# Patient Record
Sex: FEMALE | HISPANIC OR LATINO | Employment: PART TIME | URBAN - METROPOLITAN AREA
[De-identification: names, ages, dates, MRNs, and addresses within clinical notes are randomized per-mention and may not be internally consistent; named-entity substitution may affect disease eponyms.]

---

## 2022-03-18 PROCEDURE — 96374 THER/PROPH/DIAG INJ IV PUSH: CPT

## 2022-03-18 PROCEDURE — 96376 TX/PRO/DX INJ SAME DRUG ADON: CPT

## 2022-03-18 PROCEDURE — 96375 TX/PRO/DX INJ NEW DRUG ADDON: CPT

## 2022-03-18 PROCEDURE — 99284 EMERGENCY DEPT VISIT MOD MDM: CPT

## 2022-03-18 RX ORDER — SODIUM CHLORIDE 0.9 % (FLUSH) 0.9 %
10 SYRINGE (ML) INJECTION AS NEEDED
Status: DISCONTINUED | OUTPATIENT
Start: 2022-03-18 | End: 2022-03-19 | Stop reason: HOSPADM

## 2022-03-18 RX ORDER — ONDANSETRON 2 MG/ML
4 INJECTION INTRAMUSCULAR; INTRAVENOUS ONCE
Status: COMPLETED | OUTPATIENT
Start: 2022-03-18 | End: 2022-03-19

## 2022-03-19 ENCOUNTER — HOSPITAL ENCOUNTER (EMERGENCY)
Facility: HOSPITAL | Age: 47
Discharge: HOME | End: 2022-03-19
Attending: EMERGENCY MEDICINE | Admitting: EMERGENCY MEDICINE

## 2022-03-19 ENCOUNTER — APPOINTMENT (OUTPATIENT)
Dept: GENERAL RADIOLOGY | Facility: HOSPITAL | Age: 47
End: 2022-03-19

## 2022-03-19 VITALS
SYSTOLIC BLOOD PRESSURE: 98 MMHG | HEIGHT: 67 IN | BODY MASS INDEX: 32.18 KG/M2 | OXYGEN SATURATION: 94 % | TEMPERATURE: 97.3 F | HEART RATE: 68 BPM | WEIGHT: 205 LBS | RESPIRATION RATE: 14 BRPM | DIASTOLIC BLOOD PRESSURE: 57 MMHG

## 2022-03-19 DIAGNOSIS — T40.601A OPIATE OVERDOSE, ACCIDENTAL OR UNINTENTIONAL, INITIAL ENCOUNTER: Primary | ICD-10-CM

## 2022-03-19 LAB
AMPHET+METHAMPHET UR QL: NEGATIVE
BARBITURATES UR QL SCN: NEGATIVE
BENZODIAZ UR QL SCN: NEGATIVE
CANNABINOIDS SERPL QL: POSITIVE
COCAINE UR QL: POSITIVE
METHADONE UR QL SCN: NEGATIVE
OPIATES UR QL: NEGATIVE
OXYCODONE UR QL SCN: NEGATIVE

## 2022-03-19 PROCEDURE — 80307 DRUG TEST PRSMV CHEM ANLYZR: CPT | Performed by: EMERGENCY MEDICINE

## 2022-03-19 PROCEDURE — 71045 X-RAY EXAM CHEST 1 VIEW: CPT

## 2022-03-19 PROCEDURE — 25010000002 ONDANSETRON PER 1 MG: Performed by: EMERGENCY MEDICINE

## 2022-03-19 RX ORDER — NALOXONE HYDROCHLORIDE 4 MG/.1ML
1 SPRAY NASAL AS NEEDED
Qty: 1 EACH | Refills: 0 | Status: SHIPPED | OUTPATIENT
Start: 2022-03-19

## 2022-03-19 RX ORDER — NALOXONE HYDROCHLORIDE 1 MG/ML
2 INJECTION INTRAMUSCULAR; INTRAVENOUS; SUBCUTANEOUS ONCE
Status: COMPLETED | OUTPATIENT
Start: 2022-03-19 | End: 2022-03-19

## 2022-03-19 RX ORDER — NALOXONE HYDROCHLORIDE 1 MG/ML
INJECTION INTRAMUSCULAR; INTRAVENOUS; SUBCUTANEOUS
Status: COMPLETED
Start: 2022-03-19 | End: 2022-03-19

## 2022-03-19 RX ADMIN — NALOXONE HYDROCHLORIDE 2 MG: 1 INJECTION INTRAMUSCULAR; INTRAVENOUS; SUBCUTANEOUS at 10:37

## 2022-03-19 RX ADMIN — NALOXONE HYDROCHLORIDE 2 MG: 1 INJECTION PARENTERAL at 10:33

## 2022-03-19 RX ADMIN — SODIUM CHLORIDE 1000 ML: 9 INJECTION, SOLUTION INTRAVENOUS at 00:15

## 2022-03-19 RX ADMIN — ONDANSETRON 4 MG: 2 INJECTION INTRAMUSCULAR; INTRAVENOUS at 00:16

## 2022-03-19 RX ADMIN — NALOXONE HYDROCHLORIDE 2 MG: 1 INJECTION PARENTERAL at 10:37

## 2022-03-19 NOTE — ED NOTES
Pt reassessed with  states she does not have a phone and does not know daughters number to call her son vinita however vinita is not answering and voicemail is full. Pt does not seem to understand that son Tacho was admitted for overdose and is unable to help her get home at this time. Pt states she will take a cab home however does not know the address. Called [address] to speak with nurse taking care of Tacho her son states he also does not know address and to keep calling vinita. ER Charge nurse Andrea notified.

## 2022-03-19 NOTE — ED PROVIDER NOTES
EMERGENCY DEPARTMENT ENCOUNTER  I wore full protective equipment throughout this patient encounter including a N95 mask, eye shield, gown and gloves. Hand hygiene was performed before donning protective equipment and after removal when leaving the room.    Room Number:  36/36  Date of encounter:  3/19/2022  PCP: Provider, No Known   History is obtained through a .    HPI:  Context: Marixa Eugene is a 47 y.o. female who presents to the ED c/o chief complaint of decreased level of consciousness.  Patient was waiting in the waiting room for a ride home when she was found with decreased level consciousness and barely breathing.  She was seen overnight for a accidental drug overdose on heroin.  Patient does not recall taking anything in the waiting room.  She states she last used heroin last night.  She was brought in to our emergency room again with decreased level consciousness and impending respiratory arrest.  She was given a total of 4 mg of Narcan which increased her level consciousness.  Patient now complains of being cold.  She denies nausea, vomiting or belly pain.  She does admit to having gooseflesh at this time.  She states she smokes cigarettes but does not drink alcohol.    MEDICAL HISTORY REVIEW  Reviewed in EPIC    PAST MEDICAL HISTORY  Active Ambulatory Problems     Diagnosis Date Noted   • No Active Ambulatory Problems     Resolved Ambulatory Problems     Diagnosis Date Noted   • No Resolved Ambulatory Problems     No Additional Past Medical History       PAST SURGICAL HISTORY  No past surgical history on file.    FAMILY HISTORY  No family history on file.    SOCIAL HISTORY  Social History     Socioeconomic History   • Marital status:        ALLERGIES  Patient has no allergy information on record.    The patient's allergies have been reviewed    REVIEW OF SYSTEMS  All systems reviewed and negative except for those discussed in HPI.     PHYSICAL EXAM  I have reviewed the triage  vital signs and nursing notes.  ED Triage Vitals   Temp Heart Rate Resp BP SpO2   03/18/22 2352 03/18/22 2347 03/18/22 2347 03/18/22 2347 03/18/22 2347   97.3 °F (36.3 °C) 73 14 152/98 98 %      Temp src Heart Rate Source Patient Position BP Location FiO2 (%)   -- -- -- -- --              General: Mild distress.  HENT: NCAT, PERRL, Nares patent.  Eyes: no scleral icterus.  Pupils are 2 mm and reactive to light.  Neck: trachea midline, no ROM limitations.  CV: regular rhythm, regular rate.  Respiratory: normal effort, CTAB.  Abdomen: soft, nondistended, NTTP, no rebound tenderness, no guarding or rigidity.  Musculoskeletal: no deformity.  Neuro: alert, moves all extremities, follows commands.  Skin: warm, dry.    LAB RESULTS  Recent Results (from the past 24 hour(s))   Urine Drug Screen - Urine, Clean Catch    Collection Time: 03/19/22  2:48 AM    Specimen: Urine, Clean Catch   Result Value Ref Range    Amphet/Methamphet, Screen Negative Negative    Barbiturates Screen, Urine Negative Negative    Benzodiazepine Screen, Urine Negative Negative    Cocaine Screen, Urine Positive (A) Negative    Opiate Screen Negative Negative    THC, Screen, Urine Positive (A) Negative    Methadone Screen, Urine Negative Negative    Oxycodone Screen, Urine Negative Negative       I ordered the above labs and reviewed the results.    RADIOLOGY  XR Chest 1 View    Result Date: 3/19/2022  ONE VIEW PORTABLE CHEST  HISTORY: Shortness of breath.  FINDINGS: The examination is somewhat limited by the patient's obesity. The lungs are moderately expanded and grossly clear. The heart size is normal.  This report was finalized on 3/19/2022 11:08 AM by Dr. Carlos A Harris M.D.        I ordered the above noted radiological studies. I reviewed the images and results. I agree with the radiologist interpretation.    PROCEDURES  Procedures    MEDICATIONS GIVEN IN ER  Medications   sodium chloride 0.9 % flush 10 mL (has no administration in time range)    ondansetron (ZOFRAN) injection 4 mg (4 mg Intravenous Given 3/19/22 0016)   sodium chloride 0.9 % bolus 1,000 mL (0 mL Intravenous Stopped 3/19/22 0045)   Naloxone HCl (NARCAN) injection 2 mg (2 mg Intravenous Given 3/19/22 1033)   Naloxone HCl (NARCAN) injection 2 mg (2 mg Intravenous Given 3/19/22 1037)       PROGRESS, DATA ANALYSIS, CONSULTS, AND MEDICAL DECISION MAKING  A complete history and physical exam have been performed.  All available laboratory and imaging results have been reviewed by myself prior to disposition.    MDM  After the initial H&P, I discussed pertinent information from history and physical exam with patient/family.  Discussed differential diagnosis.  Discussed plan for ED evaluation/workup/treatment.  All questions answered.  Patient/family is agreeable with plan.  ED Course as of 03/19/22 1631   Sat Mar 19, 2022   1044 Patient presents with recurrent opiate overdose.  She has responded well after Narcan, 2 mg, x2.  We will observe patient for several hours.  I will obtain a chest x-ray to rule out noncardiogenic pulmonary edema. [DE]   1059 Patient is more awake and would like her wallet and money.  I have advised patient that our security has it and she will get it when she is discharged. [DE]   1158 Patient is resting comfortably.  She is not in respiratory distress. [DE]   1247 Patient was able to ambulate and her family is now in the room.  Patient requested glass of water.  We will watch patient for another half hour.  Is been over 2 hours since patient received Narcan. [DE]   1256 Patient's son found heroin in patient's bra.  Of asked security and the female nursing staff to disrobe patient to see if there are any other illegal substances on patient. [DE]   1342 Patient is awake and alert.  I think it is safe to discharge patient to home. [DE]      ED Course User Index  [DE] Rajat Oleary MD       AS OF 16:31 EDT VITALS:    BP - 98/57  HR - 68  TEMP - 97.3 °F (36.3 °C)  O2 SATS  - 94%    DIAGNOSIS  Final diagnoses:   Opiate overdose, accidental or unintentional, initial encounter (HCC)         DISPOSITION    DISCHARGE    Patient discharged in stable condition.    Reviewed implications of results, diagnosis, meds, responsibility to follow up, warning signs and symptoms of possible worsening, potential complications and reasons to return to ER.    Patient/Family voiced understanding of above instructions.    Discussed plan for discharge, as there is no emergent indication for admission. Patient referred to primary care provider for BP management due to today's BP. Pt/family is agreeable and understands need for follow up and repeat testing.  Pt is aware that discharge does not mean that nothing is wrong but it indicates no emergency is present that requires admission and they must continue care with follow-up as given below or physician of their choice.     FOLLOW-UP  Provider, No Known  [facility] [address]  [phone]    In 2 days      [facility]  [address] [place] [id number]  [phone]  In 2 days           Medication List      New Prescriptions    naloxone 4 MG/0.1ML nasal spray  Commonly known as: NARCAN  1 spray into the nostril(s) as directed by provider As Needed (opiate overdose).           Where to Get Your Medications      You can get these medications from any pharmacy    Bring a paper prescription for each of these medications  · naloxone 4 MG/0.1ML nasal spray          Rajat Oleary MD  03/19/22 3898

## 2022-03-19 NOTE — ED NOTES
Patient from home via EMS. EMS activated by family members after patient's son found in apartment unresponsive, history of heroin. Patient received 2mg intranasal narcan upon EMS arrival on scene. Patient found sitting in a chair, in and and out of consciousness. After receiving narcan patient became more responsive, still lethargic. Patient Turkmen speaking. Patient's son states they used cocaine, or they could have possibly used heroin.

## 2022-03-19 NOTE — ED NOTES
Attempted to call pt son with two different phone numbers one number has been disconnected second number voicemail was full.

## 2022-03-19 NOTE — ED PROVIDER NOTES
EMERGENCY DEPARTMENT ENCOUNTER    Room Number:  09/09  Date of encounter:  3/19/2022  PCP: Provider, No Known  Historian: Patient      HPI:  Chief Complaint: Overdose  A complete HPI/ROS/PMH/PSH/SH/FH are unobtainable due to: None    Context: Marixa Eugene is a 47 y.o. female who presents to the ED via EMS.  They were called to the home for a drug overdose patient son was unconscious and patient was found in a chair with waxing and waning consciousness.  Patient was given intranasal Narcan which improved her mental status.  Patient's son apparently stated that they used cocaine or it might have been heroin.  Patient at this point denying any and all drug use.  States she just feels a little bit tired.  Denies any other complaints.      PAST MEDICAL HISTORY  Active Ambulatory Problems     Diagnosis Date Noted   • No Active Ambulatory Problems     Resolved Ambulatory Problems     Diagnosis Date Noted   • No Resolved Ambulatory Problems     No Additional Past Medical History         PAST SURGICAL HISTORY  No past surgical history on file.      FAMILY HISTORY  No family history on file.      SOCIAL HISTORY  Social History     Socioeconomic History   • Marital status:          ALLERGIES  Patient has no allergy information on record.        REVIEW OF SYSTEMS  Review of Systems     All systems reviewed and negative except for those discussed in HPI.       PHYSICAL EXAM    I have reviewed the triage vital signs and nursing notes.    ED Triage Vitals   Temp Heart Rate Resp BP SpO2   03/18/22 2352 03/18/22 2347 03/18/22 2347 03/18/22 2347 03/18/22 2347   97.3 °F (36.3 °C) 73 14 152/98 98 %      Temp src Heart Rate Source Patient Position BP Location FiO2 (%)   -- -- -- -- --              Physical Exam  GENERAL: not distressed  HENT: nares patent  EYES: no scleral icterus  CV: regular rhythm, regular rate  RESPIRATORY: normal effort, clear to station bilaterally  ABDOMEN: soft nontender nondistended  MUSCULOSKELETAL:  no deformity  NEURO: alert, moves all extremities, follows commands  SKIN: warm, dry        LAB RESULTS  Recent Results (from the past 24 hour(s))   Urine Drug Screen - Urine, Clean Catch    Collection Time: 03/19/22  2:48 AM    Specimen: Urine, Clean Catch   Result Value Ref Range    Amphet/Methamphet, Screen Negative Negative    Barbiturates Screen, Urine Negative Negative    Benzodiazepine Screen, Urine Negative Negative    Cocaine Screen, Urine Positive (A) Negative    Opiate Screen Negative Negative    THC, Screen, Urine Positive (A) Negative    Methadone Screen, Urine Negative Negative    Oxycodone Screen, Urine Negative Negative       Ordered the above labs and independently reviewed the results.        RADIOLOGY  No Radiology Exams Resulted Within Past 24 Hours    I ordered the above noted radiological studies. Reviewed by me and discussed with radiologist.  See dictation for official radiology interpretation.      PROCEDURES    Procedures      MEDICATIONS GIVEN IN ER    Medications   sodium chloride 0.9 % flush 10 mL (has no administration in time range)   ondansetron (ZOFRAN) injection 4 mg (4 mg Intravenous Given 3/19/22 0016)   sodium chloride 0.9 % bolus 1,000 mL (0 mL Intravenous Stopped 3/19/22 0045)         PROGRESS, DATA ANALYSIS, CONSULTS, AND MEDICAL DECISION MAKING    All labs have been independently reviewed by me.  All radiology studies have been reviewed by me and discussed with radiologist dictating the report.   EKG's independently viewed and interpreted by me.  Discussion below represents my analysis of pertinent findings related to patient's condition, differential diagnosis, treatment plan and final disposition.        ED Course as of 03/23/22 0626   Sat Mar 19, 2022   1044 Patient presents with recurrent opiate overdose.  She has responded well after Narcan, 2 mg, x2.  We will observe patient for several hours.  I will obtain a chest x-ray to rule out noncardiogenic pulmonary edema. [DE]    1059 Patient is more awake and would like her wallet and money.  I have advised patient that our security has it and she will get it when she is discharged. [DE]   1158 Patient is resting comfortably.  She is not in respiratory distress. [DE]   1247 Patient was able to ambulate and her family is now in the room.  Patient requested glass of water.  We will watch patient for another half hour.  Is been over 2 hours since patient received Narcan. [DE]   1256 Patient's son found heroin in patient's bra.  Of asked security and the female nursing staff to disrobe patient to see if there are any other illegal substances on patient. [DE]   1342 Patient is awake and alert.  I think it is safe to discharge patient to home. [DE]      ED Course User Index  [DE] Rajat Oleary MD           PPE: The patient wore a surgical mask throughout the entire patient encounter. I wore an N95.    AS OF 05:25 EDT VITALS:    BP - 113/65  HR - 69  TEMP - 97.3 °F (36.3 °C)  O2 SATS - 92%        DIAGNOSIS  Final diagnoses:   Opiate overdose, accidental or unintentional, initial encounter ([facility])         DISPOSITION  Discharge           Dereje Iqbal MD  03/19/22 3026       Dereje Iqbal MD  03/23/22 1051

## 2022-03-19 NOTE — ED NOTES
Pt son came to room and talked to the patient about what she took earlier. Pt reached into her bra and pulled out a wadded up paper towel and a folded up dollar and handed it to the son. The son dropped it into a lab bag that this RN was holding. This RN called security and the lab bag was given to them. With security pt was undressed and placed in a gown. Pt clothing was searched by security.

## 2022-03-19 NOTE — ED NOTES
Reyes [id number] used to go over discharge instructions. Still awaiting an answer from son for discharge pickup.

## 2022-03-23 ENCOUNTER — APPOINTMENT (OUTPATIENT)
Dept: GENERAL RADIOLOGY | Facility: HOSPITAL | Age: 47
End: 2022-03-23

## 2022-03-23 ENCOUNTER — HOSPITAL ENCOUNTER (EMERGENCY)
Facility: HOSPITAL | Age: 47
Discharge: HOME | End: 2022-03-23
Attending: EMERGENCY MEDICINE | Admitting: EMERGENCY MEDICINE

## 2022-03-23 VITALS
OXYGEN SATURATION: 90 % | WEIGHT: 205 LBS | TEMPERATURE: 97.9 F | BODY MASS INDEX: 35 KG/M2 | RESPIRATION RATE: 15 BRPM | HEART RATE: 74 BPM | HEIGHT: 64 IN | DIASTOLIC BLOOD PRESSURE: 63 MMHG | SYSTOLIC BLOOD PRESSURE: 124 MMHG

## 2022-03-23 DIAGNOSIS — R51.9 ACUTE NONINTRACTABLE HEADACHE, UNSPECIFIED HEADACHE TYPE: ICD-10-CM

## 2022-03-23 DIAGNOSIS — T50.901A ACCIDENTAL OVERDOSE, INITIAL ENCOUNTER: Primary | ICD-10-CM

## 2022-03-23 LAB
ALBUMIN SERPL-MCNC: 4.1 G/DL (ref 3.5–5.2)
ALBUMIN/GLOB SERPL: 1.3 G/DL
ALP SERPL-CCNC: 86 U/L (ref 39–117)
ALT SERPL W P-5'-P-CCNC: 13 U/L (ref 1–33)
ANION GAP SERPL CALCULATED.3IONS-SCNC: 16.5 MMOL/L (ref 5–15)
AST SERPL-CCNC: 10 U/L (ref 1–32)
BASOPHILS # BLD AUTO: 0.05 10*3/MM3 (ref 0–0.2)
BASOPHILS NFR BLD AUTO: 0.4 % (ref 0–1.5)
BILIRUB SERPL-MCNC: 0.2 MG/DL (ref 0–1.2)
BUN SERPL-MCNC: 10 MG/DL (ref 6–20)
BUN/CREAT SERPL: 12.5 (ref 7–25)
CALCIUM SPEC-SCNC: 9.2 MG/DL (ref 8.6–10.5)
CHLORIDE SERPL-SCNC: 98 MMOL/L (ref 98–107)
CO2 SERPL-SCNC: 23.5 MMOL/L (ref 22–29)
CREAT SERPL-MCNC: 0.8 MG/DL (ref 0.57–1)
DEPRECATED RDW RBC AUTO: 46 FL (ref 37–54)
EGFRCR SERPLBLD CKD-EPI 2021: 92.2 ML/MIN/1.73
EOSINOPHIL # BLD AUTO: 0.09 10*3/MM3 (ref 0–0.4)
EOSINOPHIL NFR BLD AUTO: 0.7 % (ref 0.3–6.2)
ERYTHROCYTE [DISTWIDTH] IN BLOOD BY AUTOMATED COUNT: 12.5 % (ref 12.3–15.4)
GLOBULIN UR ELPH-MCNC: 3.2 GM/DL
GLUCOSE SERPL-MCNC: 203 MG/DL (ref 65–99)
HCT VFR BLD AUTO: 43.8 % (ref 34–46.6)
HGB BLD-MCNC: 14.9 G/DL (ref 12–15.9)
HOLD SPECIMEN: NORMAL
HOLD SPECIMEN: NORMAL
IMM GRANULOCYTES # BLD AUTO: 0.06 10*3/MM3 (ref 0–0.05)
IMM GRANULOCYTES NFR BLD AUTO: 0.5 % (ref 0–0.5)
LYMPHOCYTES # BLD AUTO: 2.45 10*3/MM3 (ref 0.7–3.1)
LYMPHOCYTES NFR BLD AUTO: 18.5 % (ref 19.6–45.3)
MCH RBC QN AUTO: 34.1 PG (ref 26.6–33)
MCHC RBC AUTO-ENTMCNC: 34 G/DL (ref 31.5–35.7)
MCV RBC AUTO: 100.2 FL (ref 79–97)
MONOCYTES # BLD AUTO: 0.5 10*3/MM3 (ref 0.1–0.9)
MONOCYTES NFR BLD AUTO: 3.8 % (ref 5–12)
NEUTROPHILS NFR BLD AUTO: 10.09 10*3/MM3 (ref 1.7–7)
NEUTROPHILS NFR BLD AUTO: 76.1 % (ref 42.7–76)
NRBC BLD AUTO-RTO: 0.1 /100 WBC (ref 0–0.2)
PLATELET # BLD AUTO: 374 10*3/MM3 (ref 140–450)
PMV BLD AUTO: 9.7 FL (ref 6–12)
POTASSIUM SERPL-SCNC: 3.2 MMOL/L (ref 3.5–5.2)
PROT SERPL-MCNC: 7.3 G/DL (ref 6–8.5)
RBC # BLD AUTO: 4.37 10*6/MM3 (ref 3.77–5.28)
SODIUM SERPL-SCNC: 138 MMOL/L (ref 136–145)
TROPONIN T SERPL-MCNC: <0.01 NG/ML (ref 0–0.03)
WBC NRBC COR # BLD: 13.24 10*3/MM3 (ref 3.4–10.8)
WHOLE BLOOD HOLD SPECIMEN: NORMAL

## 2022-03-23 PROCEDURE — 85025 COMPLETE CBC W/AUTO DIFF WBC: CPT | Performed by: NURSE PRACTITIONER

## 2022-03-23 PROCEDURE — 93010 ELECTROCARDIOGRAM REPORT: CPT | Performed by: INTERNAL MEDICINE

## 2022-03-23 PROCEDURE — 93005 ELECTROCARDIOGRAM TRACING: CPT | Performed by: NURSE PRACTITIONER

## 2022-03-23 PROCEDURE — 80053 COMPREHEN METABOLIC PANEL: CPT | Performed by: NURSE PRACTITIONER

## 2022-03-23 PROCEDURE — 84484 ASSAY OF TROPONIN QUANT: CPT | Performed by: NURSE PRACTITIONER

## 2022-03-23 PROCEDURE — 99283 EMERGENCY DEPT VISIT LOW MDM: CPT

## 2022-03-23 PROCEDURE — 71045 X-RAY EXAM CHEST 1 VIEW: CPT

## 2022-03-23 NOTE — ED PROVIDER NOTES
EMERGENCY DEPARTMENT ENCOUNTER    Room Number:  03/03  Date of encounter:  3/23/2022  PCP: Provider, No Known  Historian: Patient using Dr. Jaramillo as       PPE    Patient was placed in face mask in first look. Patient was wearing facemask when I entered the room and throughout our encounter. I wore full protective equipment throughout this patient encounter including a face mask, and gloves. Hand hygiene was performed before donning protective equipment and after removal when leaving the room.        HPI:  Chief Complaint: Reported overdose  A complete HPI/ROS/PMH/PSH/SH/FH are unobtainable due to: Nothing    Context: Marixa Eugene is a 47 y.o. female who arrives to the ED via EMS from home.  Patient presents with c/o reported overdose per EMS and son.  Patient states that she was sleeping at home and is unsure why her son called EMS.  She states a multiple times that she was just sleeping.  Patient is complaining of a mild headache but otherwise denies any complaints of pain.  Patient denies SI.  Patient states that she uses cocaine and marijuana.  She states she is not used anything in the past week.        PAST MEDICAL HISTORY  Active Ambulatory Problems     Diagnosis Date Noted   • No Active Ambulatory Problems     Resolved Ambulatory Problems     Diagnosis Date Noted   • No Resolved Ambulatory Problems     Past Medical History:   Diagnosis Date   • Substance abuse (HCC)          PAST SURGICAL HISTORY  History reviewed. No pertinent surgical history.      FAMILY HISTORY  History reviewed. No pertinent family history.      SOCIAL HISTORY  Social History     Socioeconomic History   • Marital status:    Tobacco Use   • Smokeless tobacco: Never Used   Substance and Sexual Activity   • Alcohol use: Not Currently   • Drug use: Yes     Types: Marijuana, Cocaine(coke)     Comment: heroin OD on 3/18/22 and 3/23/22.         ALLERGIES  Penicillins        REVIEW OF SYSTEMS  Review of Systems      All systems reviewed and negative except for those discussed in HPI.        PHYSICAL EXAM    ED Triage Vitals   Temp Heart Rate Resp BP SpO2   03/23/22 1705 03/23/22 1705 03/23/22 1705 03/23/22 1705 03/23/22 1705   97.9 °F (36.6 °C) 93 20 137/77 99 %       Physical Exam  GENERAL: Well appearing, nontoxic appearing, not distressed  HENT: normocephalic, atraumatic  EYES: no scleral icterus, PERRL  CV: regular rhythm, regular rate, no murmur  RESPIRATORY: normal effort, CTAB  ABDOMEN: soft, nontender  MUSCULOSKELETAL: no deformity  NEURO: alert, moves all extremities, follows commands, mental status normal/baseline  SKIN: warm, dry, no rash   Psych: Appropriate mood and affect  Nursing notes and vital signs reviewed      LAB RESULTS  Recent Results (from the past 24 hour(s))   Green Top (Gel)    Collection Time: 03/23/22  5:22 PM   Result Value Ref Range    Extra Tube Hold for add-ons.    Lavender Top    Collection Time: 03/23/22  5:22 PM   Result Value Ref Range    Extra Tube hold for add-on    Gold Top - SST    Collection Time: 03/23/22  5:22 PM   Result Value Ref Range    Extra Tube Hold for add-ons.    Comprehensive Metabolic Panel    Collection Time: 03/23/22  5:22 PM    Specimen: Blood   Result Value Ref Range    Glucose 203 (H) 65 - 99 mg/dL    BUN 10 6 - 20 mg/dL    Creatinine 0.80 0.57 - 1.00 mg/dL    Sodium 138 136 - 145 mmol/L    Potassium 3.2 (L) 3.5 - 5.2 mmol/L    Chloride 98 98 - 107 mmol/L    CO2 23.5 22.0 - 29.0 mmol/L    Calcium 9.2 8.6 - 10.5 mg/dL    Total Protein 7.3 6.0 - 8.5 g/dL    Albumin 4.10 3.50 - 5.20 g/dL    ALT (SGPT) 13 1 - 33 U/L    AST (SGOT) 10 1 - 32 U/L    Alkaline Phosphatase 86 39 - 117 U/L    Total Bilirubin 0.2 0.0 - 1.2 mg/dL    Globulin 3.2 gm/dL    A/G Ratio 1.3 g/dL    BUN/Creatinine Ratio 12.5 7.0 - 25.0    Anion Gap 16.5 (H) 5.0 - 15.0 mmol/L    eGFR 92.2 >60.0 mL/min/1.73   Troponin    Collection Time: 03/23/22  5:22 PM    Specimen: Blood   Result Value Ref Range     Troponin T <0.010 0.000 - 0.030 ng/mL   CBC Auto Differential    Collection Time: 03/23/22  5:22 PM    Specimen: Blood   Result Value Ref Range    WBC 13.24 (H) 3.40 - 10.80 10*3/mm3    RBC 4.37 3.77 - 5.28 10*6/mm3    Hemoglobin 14.9 12.0 - 15.9 g/dL    Hematocrit 43.8 34.0 - 46.6 %    .2 (H) 79.0 - 97.0 fL    MCH 34.1 (H) 26.6 - 33.0 pg    MCHC 34.0 31.5 - 35.7 g/dL    RDW 12.5 12.3 - 15.4 %    RDW-SD 46.0 37.0 - 54.0 fl    MPV 9.7 6.0 - 12.0 fL    Platelets 374 140 - 450 10*3/mm3    Neutrophil % 76.1 (H) 42.7 - 76.0 %    Lymphocyte % 18.5 (L) 19.6 - 45.3 %    Monocyte % 3.8 (L) 5.0 - 12.0 %    Eosinophil % 0.7 0.3 - 6.2 %    Basophil % 0.4 0.0 - 1.5 %    Immature Grans % 0.5 0.0 - 0.5 %    Neutrophils, Absolute 10.09 (H) 1.70 - 7.00 10*3/mm3    Lymphocytes, Absolute 2.45 0.70 - 3.10 10*3/mm3    Monocytes, Absolute 0.50 0.10 - 0.90 10*3/mm3    Eosinophils, Absolute 0.09 0.00 - 0.40 10*3/mm3    Basophils, Absolute 0.05 0.00 - 0.20 10*3/mm3    Immature Grans, Absolute 0.06 (H) 0.00 - 0.05 10*3/mm3    nRBC 0.1 0.0 - 0.2 /100 WBC   ECG 12 Lead    Collection Time: 03/23/22  6:48 PM   Result Value Ref Range    QT Interval 404 ms       Ordered the above labs and independently reviewed the results.      RADIOLOGY  XR Chest 1 View    Result Date: 3/23/2022  XR CHEST 1 VW-  HISTORY: Female who is 47 years-old,  possible overdose  TECHNIQUE: Frontal view of the chest  COMPARISON: 03/19/2022  FINDINGS: Heart, mediastinum and pulmonary vasculature are unremarkable. No focal pulmonary consolidation, pleural effusion, or pneumothorax. No acute osseous process.      No focal pulmonary consolidation. Follow-up as clinical indications persist.  This report was finalized on 3/23/2022 6:55 PM by Dr. Everton Moore M.D.        I ordered the above noted radiological studies and viewed the images on the PACS system.       EKG      Independently viewed by me and interpreted by Dr Ruiz         MEDICAL RECORD REVIEW  Medical  records reviewed in [facility], patient was seen here March 19, 2022 on 2 separate occasions.  The first visit was for drug overdose on cocaine with her son.  She had been discharged and was found with decreased level of consciousness in the waiting room at this ER.  She received Narcan on both of those occasions.  Patient's tox screen was positive for cocaine and marijuana on March 19.      PROCEDURES    Procedures        DIFFERENTIAL DIAGNOSIS  Differential diagnosis include but are not limited to the following: Accidental overdose, substance abuse, electrolyte abnormality, suicidal ideations,      PROGRESS, DATA ANALYSIS, CONSULTS, AND MEDICAL DECISION MAKING        ED Course as of 03/23/22 2307   Wed Mar 23, 2022   1851 Patient is a 47-year-old female in no acute distress who presents after a reported overdose by EMS.  Patient son called due to patient became unresponsive.  Patient was given Narcan and became more responsive per EMS.  Will check basic labs, EKG and a chest x-ray.  If all of those are within normal limits the plan is to discharge patient home. [MS]   1852 Reviewed pt's history and workup with Dr. Jaramillo.  After a bedside evaluation, they agree with the plan of care.       [MS]   2009 I viewed the patient's chest imaging in PACS.  My interpretation is no infiltrate or bony abnormality.  See dictation for official radiology interpretation.     [MS]   2028 Patient is very upset after speaking with her son, Dr. Jaramillo updated her on the unremarkable work-up done here today.  Son states that he will be here to pick patient up soon.  Patient was given strict return to ER precautions, was advised to stop using illegal substances and was given follow-up resources. [MS]      ED Course User Index  [MS] Rupal Green APRN     Discussed plan for discharge, as there is no emergent indication for admission. Pt/family is agreeable and understands need for follow up and repeat testing.  Pt is aware that  discharge does not mean that nothing is wrong but it indicates no emergency is present that requires admission and they must continue care with follow-up as given below or physician of their choice.   Patient/Family voiced understanding of above instructions.  Patient discharged in stable condition.    DIAGNOSIS  Final diagnoses:   Accidental overdose, initial encounter   Acute nonintractable headache, unspecified headache type       FOLLOW UP   PATIENT CONNECTION - [facility] [address]  [phone]  Schedule an appointment as soon as possible for a visit         RX     Medication List      No changes were made to your prescriptions during this visit.             MEDICATIONS GIVEN IN ED    Medications - No data to display        COURSE & MEDICAL DECISION MAKING  Any/All labs and Any/All Imaging studies that were ordered were reviewed and are noted above.  Results were reviewed/discussed with the patient and they were also made aware of online access.    Pt also made aware that some labs, such as cultures, will not be resulted during ER visit and followup with PMD is necessary.        Rupal Green, APRN  03/23/22 1447

## 2022-03-23 NOTE — ED TRIAGE NOTES
Patient from home via EMS with heroin overdose. Patient was found apenic and family called EMS. Patient recently here twice in the same day for same. Patient was given 2mg intranasal narcan and 1mg IV. Patient is now A&Ox4 at this time in triage.

## 2022-03-23 NOTE — ED PROVIDER NOTES
MD ATTESTATION NOTE    The AIDAN and I have discussed this patient's history, physical exam, and treatment plan.    I provided a substantive portion of the care of this patient. I personally performed the physical exam, in its entirety. The attached note describes my personal findings.      Marixa Eugene is a 47 y.o. female who presents to the ED c/o reported overdose.  Patient is awake and alert.  She states that she was sleeping at home.  Her son called EMS.  She does not know why she called him because she was just sleeping.  States that she feels fine.  She has no pain aside from a mild headache.  She has been here previously for drug overdoses.  She states that she uses cocaine and cannabis.  However, she states that she has not used any drugs for at least 1 week.      On exam:  GENERAL: not distressed  HENT: nares patent  EYES: no scleral icterus, pupils 3 mm and reactive to light bilaterally  CV: regular rhythm, regular rate  RESPIRATORY: normal effort, clear to auscultation bilaterally  ABDOMEN: soft, nontender  MUSCULOSKELETAL: no deformity  NEURO: alert, moves all extremities, follows commands  SKIN: warm, dry, multiple tattoos including tattoo lip liner    Labs  No results found for this or any previous visit (from the past 24 hour(s)).    Radiology  No Radiology Exams Resulted Within Past 24 Hours    Medical Decision Making:  ED Course as of 03/23/22 2307   Wed Mar 23, 2022   1851 Patient is a 47-year-old female in no acute distress who presents after a reported overdose by EMS.  Patient son called due to patient became unresponsive.  Patient was given Narcan and became more responsive per EMS.  Will check basic labs, EKG and a chest x-ray.  If all of those are within normal limits the plan is to discharge patient home. [MS]   1852 Reviewed pt's history and workup with Dr. Jaramillo.  After a bedside evaluation, they agree with the plan of care.       [MS]   2009 I viewed the patient's chest imaging in PACS.   My interpretation is no infiltrate or bony abnormality.  See dictation for official radiology interpretation.     [MS]   2028 Patient is very upset after speaking with her son, Dr. Jaramillo updated her on the unremarkable work-up done here today.  Son states that he will be here to pick patient up soon.  Patient was given strict return to ER precautions, was advised to stop using illegal substances and was given follow-up resources. [MS]      ED Course User Index  [MS] Rupal Green, APRN           PPE: Both the patient and I wore a surgical mask throughout the entire patient encounter. I wore protective goggles.     Diagnosis  Final diagnoses:   Accidental overdose, initial encounter   Acute nonintractable headache, unspecified headache type        Dereje Jaramillo II, MD  03/23/22 7925

## 2022-03-23 NOTE — ED NOTES
"Pt reports she was at home \"sleeping\" when she was transported to the hospital. Pt was told that she overdosed on heroin and had to be given narcan because she was unresponsive. Pt has no response to this and acts confused when told she overdosed. I asked her if she was trying to harm herself and she states \"why would I be harming myself?\" Patient was again told she overdosed via  and she says \"how?\". Pt calm and cooperative at this time.     Security has pt's clothes and belongings and searched them due to pt hiding heroin in her bra and overdosing in the lobby during her last visit.   "

## 2022-03-24 LAB — QT INTERVAL: 404 MS

## 2022-03-24 NOTE — DISCHARGE INSTRUCTIONS
Follow-up with your PMD or clinic of choice as needed  Please avoid using any illegal substances  Return to the ER for fever, chills, chest pain, shortness of breath, dizziness, weakness, headache or any new or worsening symptoms    Community Clinics available for follow up:      [facility]             [address] [facility]  [phone]    [facility]  [address] [facility]  [phone]    [address]  [phone]    [place] [address]  [phone]    [facility]  [address] [facility]  [phone]    [facility]  [address]  [phone]    [place] [address]  (off [facility])  [phone]    [facility] [address]  [phone]    [facility]  [address]  [phone]    [facility]  [phone]    [facility]  [address]  [phone]    [facility] at [address]  [phone]    [facility]  [address] [facility]  [phone]